# Patient Record
Sex: MALE | Race: BLACK OR AFRICAN AMERICAN | NOT HISPANIC OR LATINO | Employment: FULL TIME | URBAN - METROPOLITAN AREA
[De-identification: names, ages, dates, MRNs, and addresses within clinical notes are randomized per-mention and may not be internally consistent; named-entity substitution may affect disease eponyms.]

---

## 2023-08-04 PROBLEM — Z48.02 VISIT FOR SUTURE REMOVAL: Status: ACTIVE | Noted: 2023-08-04

## 2024-01-26 ENCOUNTER — TELEPHONE (OUTPATIENT)
Dept: SURGERY | Facility: CLINIC | Age: 59
End: 2024-01-26

## 2024-01-26 NOTE — TELEPHONE ENCOUNTER
----- Message from Anjana Juárez sent at 1/26/2024  1:54 PM CST -----  Regarding: appt; sooner appt / ER f/u  Contact: PT @ [phone]  Pt is calling asking asking to speak with someone in the office to schedule an appt. Pt was just recently in the ER on 1/26/2024 and is needing to be seen soon. No available appts in Epic for the time frame pt is needing to be seen, according to their discharge paper work. Please call pt to schedule. Thanks.      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 Within what time frame? Asking to be seen on Monday, January 29th; pt is off on this date; he is also off on Tuesdays.     Additional Information: Pt does not have insurance. I have advised pt that office visit will be $248.17; pt understands but has advised that he was not charged in the past.

## 2024-01-26 NOTE — TELEPHONE ENCOUNTER
Spoke with patient. Pt advise that unfortunately we cannot schedule. Pt needs to have insurance. Financial assistance  Phone number provide. Pt advise to call to schedule.

## 2025-01-13 ENCOUNTER — OFFICE VISIT (OUTPATIENT)
Dept: SURGERY | Facility: CLINIC | Age: 60
End: 2025-01-13

## 2025-01-13 VITALS
WEIGHT: 159.19 LBS | SYSTOLIC BLOOD PRESSURE: 157 MMHG | HEART RATE: 80 BPM | BODY MASS INDEX: 26.49 KG/M2 | DIASTOLIC BLOOD PRESSURE: 93 MMHG

## 2025-01-13 DIAGNOSIS — D17.1 LIPOMA OF TORSO: Primary | ICD-10-CM

## 2025-01-13 PROCEDURE — 99215 OFFICE O/P EST HI 40 MIN: CPT | Mod: PBBFAC,PN | Performed by: SURGERY

## 2025-01-13 PROCEDURE — 99999 PR PBB SHADOW E&M-EST. PATIENT-LVL V: CPT | Mod: PBBFAC,,, | Performed by: SURGERY

## 2025-01-13 PROCEDURE — 99203 OFFICE O/P NEW LOW 30 MIN: CPT | Mod: S$PBB,,, | Performed by: SURGERY

## 2025-01-13 RX ORDER — ENOXAPARIN SODIUM 100 MG/ML
40 INJECTION SUBCUTANEOUS
Status: CANCELLED | OUTPATIENT
Start: 2025-01-13

## 2025-01-13 RX ORDER — SODIUM CHLORIDE 9 MG/ML
INJECTION, SOLUTION INTRAVENOUS CONTINUOUS
Status: CANCELLED | OUTPATIENT
Start: 2025-01-13

## 2025-01-13 NOTE — PROGRESS NOTES
History & Physical    Subjective     History of Present Illness:  Patient is a 59 y.o. male presents with a 6 x 3 cm soft tissue mass in the mid line of the back  Patient had wanted another mass removed near the cervical spine in the past.    States this 1 has gotten slightly larger lately he would like removed.  Discussed with him due to the size and location recommend surgical intervention in the OR.    Patient agrees to this plan.      Chief Complaint   Patient presents with    Cyst       Review of patient's allergies indicates:  No Known Allergies    Current Outpatient Medications   Medication Sig Dispense Refill    acetaminophen-codeine 300-30mg (TYLENOL #3) 300-30 mg Tab Take 1 tablet by mouth every 4 (four) hours as needed. 12 tablet 0    aspirin (ECOTRIN) 81 MG EC tablet Take 81 mg by mouth once daily.      azelastine (ASTELIN) 137 mcg (0.1 %) nasal spray 1 spray (137 mcg total) by Nasal route 2 (two) times daily. 30 mL 0    butalbital-acetaminophen-caffeine -40 mg (FIORICET, ESGIC) -40 mg per tablet Take 1 tablet by mouth every 6 (six) hours as needed. Label with sedative precautions 20 tablet 0    HYDROcodone-acetaminophen (NORCO) 5-325 mg per tablet Take 1 tablet by mouth every 4 (four) hours as needed for Pain. 10 tablet 0    ibuprofen (ADVIL,MOTRIN) 600 MG tablet Take 1 tablet (600 mg total) by mouth every 6 (six) hours as needed for Pain. 20 tablet 0    mupirocin (BACTROBAN) 2 % ointment Apply topically 3 (three) times daily. 15 g 0    ondansetron (ZOFRAN) 4 MG tablet Take 1 tablet (4 mg total) by mouth every 6 (six) hours as needed for Nausea. 12 tablet 0    triamcinolone (NASACORT) 55 mcg nasal inhaler 2 sprays by Nasal route once daily. 16.9 mL 0     No current facility-administered medications for this visit.       Past Medical History:   Diagnosis Date    Pain, dental      Past Surgical History:   Procedure Laterality Date    BACK SURGERY       No family history on file.  Social  History     Tobacco Use    Smoking status: Never    Smokeless tobacco: Never   Substance Use Topics    Alcohol use: Yes     Comment: occasionally    Drug use: Yes     Frequency: 7.0 times per week     Types: Marijuana        Review of Systems:  Review of Systems   Constitutional:  Negative for appetite change, fatigue, fever and unexpected weight change.   HENT:  Negative for sore throat and trouble swallowing.    Eyes: Negative.    Respiratory:  Negative for cough, shortness of breath and wheezing.    Cardiovascular:  Negative for chest pain and leg swelling.   Gastrointestinal:  Negative for abdominal distention, abdominal pain, blood in stool, constipation, diarrhea, nausea and vomiting.   Endocrine: Negative.    Genitourinary: Negative.    Musculoskeletal:  Negative for back pain.   Skin: Negative.  Negative for rash.   Allergic/Immunologic: Negative.    Neurological: Negative.    Hematological: Negative.    Psychiatric/Behavioral:  Negative for confusion.         Objective     Vital Signs (Most Recent)  Pulse: 80 (01/13/25 1305)  BP: (!) 157/93 (01/13/25 1305)     72.2 kg (159 lb 2.8 oz)     Physical Exam:  Physical Exam  Vitals and nursing note reviewed.   Constitutional:       Appearance: He is well-developed.   HENT:      Head: Normocephalic and atraumatic.   Cardiovascular:      Rate and Rhythm: Normal rate.      Heart sounds: Normal heart sounds.   Pulmonary:      Effort: Pulmonary effort is normal.   Abdominal:      General: Bowel sounds are normal. There is no distension.      Palpations: Abdomen is soft.      Tenderness: There is no abdominal tenderness.   Musculoskeletal:         General: Normal range of motion.      Cervical back: Normal range of motion.   Skin:     General: Skin is warm and dry.      Capillary Refill: Capillary refill takes less than 2 seconds.             Comments: 6 x 3 cm soft tissue mass in mid back, mobile, lipomatous in nature   Neurological:      Mental Status: He is alert and  oriented to person, place, and time.   Psychiatric:         Behavior: Behavior normal.     Laboratory  CBC: Reviewed  CMP: Reviewed  wnl    Diagnostic Results:  X-Ray: Reviewed  Within normal limits       Assessment and Plan   59-year-old male with a midline soft tissue mass back.    Recommend surgical intervention in the OR   All risks and benefits discussed.

## 2025-01-22 ENCOUNTER — TELEPHONE (OUTPATIENT)
Dept: SURGERY | Facility: CLINIC | Age: 60
End: 2025-01-22

## 2025-01-22 NOTE — TELEPHONE ENCOUNTER
Spoke with pt. Pt states he would like reschedule his surgery due to the weather. Advised pt that I would send a message to Dr Sheikh for a new surgery date and be in touch later this week/early next week to confirm new date. All questions answered. Pt verbalized understanding.

## 2025-01-22 NOTE — TELEPHONE ENCOUNTER
----- Message from Delaney sent at 1/22/2025  8:40 AM CST -----  Regarding: Surgery R/S  Reschedule Existing Appointment     Current appt date:1/23     Type of appt :Surgery     Physician: Cordell Sheikh MD     Reason for rescheduling:Patient called to r/s tomorrows surgery due to weather.     Caller:AYDEN LOCKE     Contact Preference: [phone]

## 2025-01-24 ENCOUNTER — TELEPHONE (OUTPATIENT)
Dept: SURGERY | Facility: CLINIC | Age: 60
End: 2025-01-24

## 2025-01-24 NOTE — TELEPHONE ENCOUNTER
----- Message from Loan sent at 1/24/2025  9:37 AM CST -----  Regarding: Pt Advice  Contact: pt [phone]  Pt is requesting call back to discuss plan of care, pt was speaking with provider staff and was informing of current medication, pt states was given Hibiclens to use prior to surgery and was never used, please call pt @[phone]

## 2025-01-31 DIAGNOSIS — G89.18 POST-OP PAIN: Primary | ICD-10-CM

## 2025-01-31 RX ORDER — ACETAMINOPHEN AND CODEINE PHOSPHATE 300; 30 MG/1; MG/1
1 TABLET ORAL EVERY 4 HOURS PRN
Qty: 12 TABLET | Refills: 0 | Status: SHIPPED | OUTPATIENT
Start: 2025-01-31

## 2025-02-11 ENCOUNTER — OFFICE VISIT (OUTPATIENT)
Dept: SURGERY | Facility: CLINIC | Age: 60
End: 2025-02-11

## 2025-02-11 VITALS
SYSTOLIC BLOOD PRESSURE: 137 MMHG | WEIGHT: 162.25 LBS | HEART RATE: 98 BPM | BODY MASS INDEX: 27 KG/M2 | DIASTOLIC BLOOD PRESSURE: 87 MMHG

## 2025-02-11 DIAGNOSIS — Z98.890 POST-OPERATIVE STATE: Primary | ICD-10-CM

## 2025-02-11 PROCEDURE — 99213 OFFICE O/P EST LOW 20 MIN: CPT | Mod: PBBFAC,PN | Performed by: SURGERY

## 2025-02-17 NOTE — PROGRESS NOTES
Saul Welch is a 59 y.o. male patient.   Status post back mass excision.    Patient doing well.    Pain well-controlled.    Scar healing well.    No sign of infection.    Some of the Dermabond still in place.      No diagnosis found.  Past Medical History:   Diagnosis Date    Pain, dental      No past surgical history pertinent negatives on file.  Scheduled Meds:  Continuous Infusions:  PRN Meds:    Review of patient's allergies indicates:  No Known Allergies  There are no hospital problems to display for this patient.    Blood pressure 137/87, pulse 98, weight 73.6 kg (162 lb 4.1 oz).    Subjective:   Diet: Adequate intake.  Patient reports no nausea.    Activity level: Returning to normal.    Pain control: Well controlled.    Objective:  Vital signs (most recent): Blood pressure 137/87, pulse 98, weight 73.6 kg (162 lb 4.1 oz).  General appearance: Comfortable.    Lungs:  Normal effort.    Heart: Normal rate.    Abdomen: Abdomen is soft.    Bowel sounds:  Bowel sounds are normal.    Tenderness: There is no abdominal tenderness tenderness.    Wound:  Clean.  There is no drainage.    Extremities: There is normal range of motion.    Assessment:   Condition: In stable condition.     Status post back mass excision   Pathology reviewed came back as a large lipoma.    Return to clinic p.r.n.     Cordell Sheikh MD  2/17/2025